# Patient Record
Sex: MALE | URBAN - NONMETROPOLITAN AREA
[De-identification: names, ages, dates, MRNs, and addresses within clinical notes are randomized per-mention and may not be internally consistent; named-entity substitution may affect disease eponyms.]

---

## 2018-08-21 PROBLEM — E11.9: Noted: 2019-10-25

## 2018-08-21 PROBLEM — H16.223: Noted: 2020-03-09

## 2018-08-21 PROBLEM — Z96.1: Noted: 2018-08-21

## 2018-08-21 PROBLEM — L71.8: Noted: 2019-10-25

## 2018-08-21 PROBLEM — H26.491: Noted: 2019-11-04

## 2018-08-21 PROBLEM — H04.123: Noted: 2019-10-25

## 2018-08-21 PROBLEM — H40.11X2: Noted: 2019-10-25

## 2018-08-21 PROBLEM — L71.8: Noted: 2018-08-21

## 2018-08-21 PROBLEM — H26.493: Noted: 2019-10-29

## 2018-08-21 PROBLEM — H40.1131: Noted: 2020-02-13

## 2018-08-21 PROBLEM — E11.9: Noted: 2018-05-22

## 2018-08-21 PROBLEM — L71.8: Noted: 2020-03-09

## 2019-02-25 ENCOUNTER — IMPORTED ENCOUNTER (OUTPATIENT)
Dept: URBAN - NONMETROPOLITAN AREA CLINIC 1 | Facility: CLINIC | Age: 79
End: 2019-02-25

## 2019-02-25 PROCEDURE — 99213 OFFICE O/P EST LOW 20 MIN: CPT

## 2019-02-25 NOTE — PATIENT DISCUSSION
POAG OU- Discussed diagnosis in detail with patient- IOP stable today at [address] done today: Grade IV w/ moderate pigment- OCT disc done today reliable normal RNFL OU- [facility] done today 560/564 normal limits- Cup to disc noted at . 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

## 2019-05-23 ENCOUNTER — IMPORTED ENCOUNTER (OUTPATIENT)
Dept: URBAN - NONMETROPOLITAN AREA CLINIC 1 | Facility: CLINIC | Age: 79
End: 2019-05-23

## 2019-05-23 PROCEDURE — 92083 EXTENDED VISUAL FIELD XM: CPT

## 2019-05-23 NOTE — PATIENT DISCUSSION
Testing Only: 24-2 VFOD: unreliable w/scattered & non-specific defectsOS: unreliable w/scattered & non-specific defectsThis test needs to be repeatedNotes below are from previous visit: POAG OU- Discussed diagnosis in detail with patient- IOP stable today at 16 OU - Gonio done today: Grade IV w/ moderate pigment- OCT disc done today reliable normal RNFL OU- [facility] done today 560/564 normal limits- Cup to disc noted at . 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

## 2019-05-24 ENCOUNTER — IMPORTED ENCOUNTER (OUTPATIENT)
Dept: URBAN - NONMETROPOLITAN AREA CLINIC 1 | Facility: CLINIC | Age: 79
End: 2019-05-24

## 2019-05-24 PROCEDURE — 99213 OFFICE O/P EST LOW 20 MIN: CPT

## 2019-05-24 NOTE — PATIENT DISCUSSION
Pseudophakia OU- Both intraocular lenses are stable and in place - PCO noted OU OS surgically ready- Refer to [address] [place] [address] only for [address] OU- Discussed diagnosis in detail with patient- IOP stable today at 15 OU - Gonio done today: Grade IV w/ moderate pigment- OCT disc done today reliable normal RNFL OU- [facility] done today 560/564 normal limits- Cup to disc noted at . 04 OD and 0.4/0.5 OS - Continue Timolol QAM OU Lumigan QHS OU - Would like to obtain further testing - RTC in 3 months; 's Notes: MRDFEVF 5/23/19OCT disc 11/19/18Gonio 11/19/18PACH Hdz 11/19/18 560/564

## 2019-10-25 ENCOUNTER — IMPORTED ENCOUNTER (OUTPATIENT)
Dept: URBAN - NONMETROPOLITAN AREA CLINIC 1 | Facility: CLINIC | Age: 79
End: 2019-10-25

## 2019-10-25 PROCEDURE — 66821 AFTER CATARACT LASER SURGERY: CPT

## 2019-10-25 PROCEDURE — 92014 COMPRE OPH EXAM EST PT 1/>: CPT

## 2019-10-25 NOTE — PATIENT DISCUSSION
PCO-Explained PCO and RBAs of YAG Capsulotomy to pt. -Pt elects to proceed.  YAG Caps OS today and YAG Caps OD in 1-2 weeks.; 's Notes: MRDFEVF 5/23/19OCT disc 11/19/18Gonio 11/19/18PACH Hdz 11/19/18 560/564

## 2019-11-01 ENCOUNTER — IMPORTED ENCOUNTER (OUTPATIENT)
Dept: URBAN - NONMETROPOLITAN AREA CLINIC 1 | Facility: CLINIC | Age: 79
End: 2019-11-01

## 2019-11-01 PROCEDURE — 66821 AFTER CATARACT LASER SURGERY: CPT

## 2019-11-01 NOTE — PATIENT DISCUSSION
PCO-Explained PCO and RBAs of YAG Capsulotomy to pt. -Pt elects to proceed.  YAG Caps OD today and POV in 1-2 weeks.; 's Notes: MRDFEVF 5/23/19OCT disc 11/19/18Gonio 11/19/18PACH Andreina 11/19/18 560/564

## 2019-11-12 ENCOUNTER — IMPORTED ENCOUNTER (OUTPATIENT)
Dept: URBAN - NONMETROPOLITAN AREA CLINIC 1 | Facility: CLINIC | Age: 79
End: 2019-11-12

## 2019-11-12 PROCEDURE — 99024 POSTOP FOLLOW-UP VISIT: CPT

## 2019-11-12 NOTE — PATIENT DISCUSSION
s/p YAG PC OU-  discussed findings w/patient-  patient is very happy with vision-  continue to monitor at 3 mo f/u POAG or prn; 's Notes: MRDFEVF 5/23/19OCT disc 11/19/18Gonio 11/19/18PACH Andreina 11/19/18 560/564

## 2020-02-13 ENCOUNTER — IMPORTED ENCOUNTER (OUTPATIENT)
Dept: URBAN - NONMETROPOLITAN AREA CLINIC 1 | Facility: CLINIC | Age: 80
End: 2020-02-13

## 2020-02-13 PROCEDURE — 99213 OFFICE O/P EST LOW 20 MIN: CPT

## 2020-02-13 PROCEDURE — 92133 CPTRZD OPH DX IMG PST SGM ON: CPT

## 2020-02-13 NOTE — PATIENT DISCUSSION
POAG OU-  Discussed diagnosis in detail with patient-  IOP stable today at 15 OU -  Gonio done 11/19/2018: Grade IV w/ moderate pigment-  OCT disc done today 2/13/2020 OD: normal RNFL OS: mild temporal thinning all other quadrants normal stable-  PACH done 11/19/2018 560/564 normal limits-  Cup to disc noted at . 04 OD and 0.4/0.5 OS -  Continue Timolol QAM OU-  continue Lumigan QHS OU -  RTC in 3 months Pseudophakia OU-  discussed findings w/patient-  s/p YAG PC OU open -  [id number] yearly or prnType II DM w/o Retinopathy OU -  discussed findings w/patient-  condition controlled with diet.  Patient was taken off of Metformin-  no retinopathy to extent seen -  discussed the importyance of good blood sugar control and the negative effects of poorly controlled sugars on ocular health -  will send notes from today to Dr. Garcia Ackerman-  monitor yearly or prn OLIVIA OU -  discussed findings w/patient-  continue Refresh Optive PRN OU -  monitor yearly or prn; 's Notes: MRDFEVF 5/23/19OCT disc 2/13/2020Gonio 11/19/18PACH Hdz 11/19/18 560/564

## 2020-05-22 ENCOUNTER — IMPORTED ENCOUNTER (OUTPATIENT)
Dept: URBAN - NONMETROPOLITAN AREA CLINIC 1 | Facility: CLINIC | Age: 80
End: 2020-05-22

## 2020-05-22 PROCEDURE — 92020 GONIOSCOPY: CPT

## 2020-05-22 PROCEDURE — 99213 OFFICE O/P EST LOW 20 MIN: CPT

## 2020-05-22 NOTE — PATIENT DISCUSSION
POAG OU-  Discussed diagnosis in detail with patient-  IOP stable today at 15 OU -  Gonio done today 5/22/2020: Grade IV w/ moderate pigment stable -  OCT disc done 2/13/2020 OD: normal RNFL OS: mild temporal thinning all other quadrants normal stable-  PACH done 11/19/2018 560/564 normal limits-  Cup to disc noted at . 04 OD and 0.4/0.5 OS -  Continue Timolol QAM OU-  continue Lumigan QHS OU -  RTC in 3 months w/24-2 VF Pseudophakia OU-  discussed findings w/patient-  s/p YAG PC OU open -  [id number] yearly or prnType II DM w/o Retinopathy OU -  discussed findings w/patient-  condition controlled with diet.  Patient was taken off of Metformin-  no retinopathy to extent seen -  discussed the importyance of good blood sugar control and the negative effects of poorly controlled sugars on ocular health -  will send notes from today to Dr. Shey Sommers-  monitor yearly or prn OLIVIA OU -  discussed findings w/patient-  continue Refresh Optive PRN OU -  monitor yearly or prn; 's Notes: MRDFEVF 5/23/19OCT disc 2/13/2020Gonio 5/22/2020PACH Hdz 11/19/18 560/564

## 2020-07-06 ENCOUNTER — IMPORTED ENCOUNTER (OUTPATIENT)
Dept: URBAN - NONMETROPOLITAN AREA CLINIC 1 | Facility: CLINIC | Age: 80
End: 2020-07-06

## 2020-07-06 PROCEDURE — 99213 OFFICE O/P EST LOW 20 MIN: CPT

## 2020-07-06 NOTE — PATIENT DISCUSSION
Superficial Punctate Keratitis OS-  discussed findings w/patient-  increase Optive to at least q1-2h OS-  continue to monitor as scheduled; 's Notes: MRDFEVF 5/23/19OCT disc 2/13/2020Gonimadai 5/22/2020PACH Andreina 11/19/18 560/565

## 2020-08-12 PROBLEM — H16.223: Noted: 2020-08-12

## 2020-08-12 PROBLEM — Z96.1: Noted: 2020-08-12

## 2020-08-12 PROBLEM — H40.1131: Noted: 2020-08-12

## 2020-08-12 PROBLEM — E11.9: Noted: 2020-08-12

## 2020-08-12 PROBLEM — H16.142: Noted: 2020-08-12

## 2020-08-12 PROBLEM — L71.8: Noted: 2020-08-12

## 2020-08-20 ENCOUNTER — IMPORTED ENCOUNTER (OUTPATIENT)
Dept: URBAN - NONMETROPOLITAN AREA CLINIC 1 | Facility: CLINIC | Age: 80
End: 2020-08-20

## 2020-08-20 PROCEDURE — 92083 EXTENDED VISUAL FIELD XM: CPT

## 2020-08-20 PROCEDURE — 99213 OFFICE O/P EST LOW 20 MIN: CPT

## 2020-08-20 NOTE — PATIENT DISCUSSION
POAG OU-  Discussed diagnosis in detail with patient-  IOP stable today at 15 OU -  24-2 VF done 8/20/2020    OD: R non-specific defects scattered scotoma stable    OS: R non-specific defects scattered scotoma stable-  Gonio done 5/22/2020: Grade IV w/ moderate pigment stable -  OCT disc done 2/13/2020     OD: normal RNFL     OS: mild temporal thinning all other quadrants normal stable-  PACH done 11/19/2018 560/564 normal limits-  Cup to disc noted at . 04 OD and 0.4/0.5 OS -  Continue Timolol QAM OU-  continue Lumigan QHS OU -  RTC in 3 mo f/u POAGPseudophakia OU-  discussed findings w/patient-  s/p YAG PC OU open -  monitor yearly or prnType II DM w/o Retinopathy OU -  discussed findings w/patient-  condition controlled with diet.  Patient was taken off of Metformin-  no retinopathy to extent seen -  discussed the importyance of good blood sugar control and the negative effects of poorly controlled sugars on ocular health -  will send notes from today to Dr. Eleanor Grant-  monitor yearly or prn OLIVIA OU -  discussed findings w/patient-  continue Refresh Optive PRN OU -  monitor yearly or prn; 's Notes: [id number] VF 8/20/2020OCT disc 2/13/2020Gonio 5/22/2020PACH Andreina 11/19/18 560/564

## 2020-11-18 PROBLEM — L71.8: Noted: 2020-11-18

## 2020-11-18 PROBLEM — H40.1131: Noted: 2020-11-18

## 2020-11-18 PROBLEM — E11.9: Noted: 2020-11-18

## 2020-11-18 PROBLEM — H16.223: Noted: 2020-11-18

## 2020-11-18 PROBLEM — Z96.1: Noted: 2020-11-18

## 2020-11-20 ENCOUNTER — IMPORTED ENCOUNTER (OUTPATIENT)
Dept: URBAN - NONMETROPOLITAN AREA CLINIC 1 | Facility: CLINIC | Age: 80
End: 2020-11-20

## 2020-11-20 PROCEDURE — 99213 OFFICE O/P EST LOW 20 MIN: CPT

## 2020-11-20 NOTE — PATIENT DISCUSSION
POAG OU-  Discussed diagnosis in detail with patient-  IOP stable today at 15 OU -  24-2 VF done 8/20/2020    OD: R non-specific defects scattered scotoma stable    OS: R non-specific defects scattered scotoma stable-  Gonio done 5/22/2020: Grade IV w/ moderate pigment stable -  OCT disc done 2/13/2020     OD: normal RNFL     OS: mild temporal thinning all other quadrants normal stable-  PACH done 11/19/2018 560/564 normal limits-  Cup to disc noted at . 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  Patient was taken off of Metformin-  no retinopathy to extent seen -  discussed the importyance of good blood sugar control and the negative effects of poorly controlled sugars on ocular health -  will send notes from today to Dr. Hammond Bodily-  monitor yearly or prn OLIVIA OU -  discussed findings w/patient-  continue Refresh Optive PRN OU -  monitor yearly or prn; 's Notes: [id number] VF 8/20/2020OCT disc 2/13/2020Gonio 5/22/2020PACH Andreina 11/19/18 560/564

## 2021-03-04 ENCOUNTER — IMPORTED ENCOUNTER (OUTPATIENT)
Dept: URBAN - NONMETROPOLITAN AREA CLINIC 1 | Facility: CLINIC | Age: 81
End: 2021-03-04

## 2021-03-04 PROCEDURE — 99213 OFFICE O/P EST LOW 20 MIN: CPT

## 2021-03-04 PROCEDURE — 92133 CPTRZD OPH DX IMG PST SGM ON: CPT

## 2021-03-04 NOTE — PATIENT DISCUSSION
POAG OU-  Discussed diagnosis in detail with patient-  IOP stable today at 15 OU -  24-2 VF done 8/20/2020    OD: R non-specific defects scattered scotoma stable    OS: R non-specific defects scattered scotoma stable-  Gonio done 5/22/2020: Grade IV w/ moderate pigment stable -  OCT disc done 3/4/2021     OD: mild temporal thinning all other quadrants normal    OS: mild temporal thinning all other quadrants normal -  PACH done 11/19/2018 560/564 normal limits-  Cup to disc noted at . 04 OD and 0.4/0.5 OS -  Continue Timolol QAM OU-  continue Lumigan QHS OU -  RTC in 3 mo f/u POAG w/ GonioPseudophakia OU-  discussed findings w/patient-  s/p YAG PC OU open -  monitor yearly or prnType II DM w/o Retinopathy OU -  discussed findings w/patient-  Last A1c 5.5-  Blood Sugars 115-  condition controlled with diet.  Patient was taken off of Metformin-  no retinopathy to extent seen -  discussed the importyance of good blood sugar control and the negative effects of poorly controlled sugars on ocular health -  will send notes from today to Dr. Hammond Bodily-  monitor yearly or prn OLIVIA OU -  discussed findings w/patient-  continue Refresh Optive PRN OU -  monitor yearly or prn; 's Notes: [id number] VF 8/20/2020OCT disc3/4/2021Gonio 5/22/2020PACH Hdz 11/19/18 560/564

## 2021-06-03 ENCOUNTER — IMPORTED ENCOUNTER (OUTPATIENT)
Dept: URBAN - NONMETROPOLITAN AREA CLINIC 1 | Facility: CLINIC | Age: 81
End: 2021-06-03

## 2021-06-03 PROCEDURE — 92020 GONIOSCOPY: CPT

## 2021-06-03 PROCEDURE — 99213 OFFICE O/P EST LOW 20 MIN: CPT

## 2021-06-03 NOTE — PATIENT DISCUSSION
POAG OU-  Discussed diagnosis in detail with patient-  IOP stable today at 15 OU -  24-2 VF done 8/20/2020    OD: R non-specific defects scattered scotoma stable    OS: R non-specific defects scattered scotoma stable-  Gonio done 7/22/2020: Grade IV w/ moderate pigment stable -  OCT disc done 3/4/2021     OD: mild temporal thinning all other quadrants normal    OS: mild temporal thinning all other quadrants normal -  PACH done 11/19/2018 560/564 normal limits-  Cup to disc noted at . 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 Garcia Ackerman-  monitor yearly or PRN; 's Notes: [id number] VF  8/20/2020OCT disc  3/4/2021Gonio 5/22/2020 6/3/2021PACH Hdz 11/19/18 560/564

## 2021-07-22 PROBLEM — E11.9: Noted: 2021-07-22

## 2021-07-22 PROBLEM — H16.223: Noted: 2020-11-18

## 2021-07-22 PROBLEM — H40.1131: Noted: 2020-11-18

## 2021-07-22 PROBLEM — Z96.1: Noted: 2021-07-22

## 2021-07-22 PROBLEM — L71.8: Noted: 2020-11-18

## 2021-09-09 ENCOUNTER — IMPORTED ENCOUNTER (OUTPATIENT)
Dept: URBAN - NONMETROPOLITAN AREA CLINIC 1 | Facility: CLINIC | Age: 81
End: 2021-09-09

## 2021-09-09 PROCEDURE — 99213 OFFICE O/P EST LOW 20 MIN: CPT

## 2021-09-09 PROCEDURE — 92083 EXTENDED VISUAL FIELD XM: CPT

## 2021-09-09 NOTE — PATIENT DISCUSSION
POAG OU-  Discussed diagnosis in detail with patient-  IOP's 16 OU-  24-2 VF done 9/9/2021    OD: R non-specific defects scattered scotoma stable    OS: R non-specific defects scattered scotoma stable-  Gonio done 7/22/2020    OD: Grade 4 w/moderate pigment    OS: Grade 4 w/moderate pigment-  OCT disc done 3/4/2021     OD: mild temporal thinning all other quadrants normal    OS: mild temporal thinning all other quadrants normal -  PACH done 11/19/2018 560/564 normal limits-  Cup to disc noted at . 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 Solitario Curry-  monitor yearly or PRN; 's Notes: [id number] VF 9/9/2021OCT disc 3/4/2021Gonio 6/3/2021PACH Andreina 11/19/18 560/564

## 2022-02-02 ENCOUNTER — PREPPED CHART (OUTPATIENT)
Dept: URBAN - NONMETROPOLITAN AREA CLINIC 4 | Facility: CLINIC | Age: 82
End: 2022-02-02

## 2022-02-02 ASSESSMENT — VISUAL ACUITY
OS_CC: 20/20
OD_CC: 20/20
OU_CC: 20/20

## 2022-02-04 ENCOUNTER — FOLLOW UP (OUTPATIENT)
Dept: URBAN - NONMETROPOLITAN AREA CLINIC 4 | Facility: CLINIC | Age: 82
End: 2022-02-04

## 2022-02-04 DIAGNOSIS — H40.1131: ICD-10-CM

## 2022-02-04 PROCEDURE — 99213 OFFICE O/P EST LOW 20 MIN: CPT

## 2022-02-04 ASSESSMENT — VISUAL ACUITY
OU_SC: 20/25
OD_SC: 20/20
OS_SC: 20/25

## 2022-02-04 ASSESSMENT — TONOMETRY
OS_IOP_MMHG: 15
OD_IOP_MMHG: 15

## 2022-04-17 ASSESSMENT — VISUAL ACUITY
OS_CC: 20/25-2
OD_CC: 20/20
OS_CC: 20/25+
OS_CC: 20/25-2
OS_PH: 20/30-1
OD_CC: 20/20-2
OS_CC: 20/40
OU_CC: 20/20
OU_CC: 20/25-
OD_GLARE: 20/60
OS_CC: J1+
OD_CC: J1+
OD_CC: 20/25+2
OD_CC: 20/20-1
OS_GLARE: 20/50
OD_GLARE: 20/60
OS_CC: 20/25
OS_CC: 20/30
OD_CC: 20/25
OS_CC: 20/25+2
OU_SC: 20/20
OU_CC: 20/20
OS_SC: 20/25
OD_GLARE: 20/20
OD_CC: 20/20
OS_CC: 20/30-1
OD_SC: 20/20
OS_CC: 20/30-1 BLURRY
OU_CC: J1+
OD_CC: 20/25
OS_GLARE: 20/200
OD_CC: 20/25+
OU_CC: J1+
OS_CC: 20/30-
OD_CC: 20/20
OD_SC: 20/25+2
OS_SC: 20/20
OD_CC: 20/20
OU_CC: 20/20

## 2022-04-17 ASSESSMENT — PACHYMETRY
OD_CT_UM: 560
OS_CT_UM: 564
OD_CT_UM: 560
OS_CT_UM: 564
OD_CT_UM: 560
OD_CT_UM: 560
OS_CT_UM: 564
OD_CT_UM: 560
OS_CT_UM: 564
OD_CT_UM: 560
OD_CT_UM: 560
OS_CT_UM: 564
OS_CT_UM: 564
OD_CT_UM: 560
OS_CT_UM: 564
OD_CT_UM: 560
OD_CT_UM: 560
OS_CT_UM: 564
OS_CT_UM: 564
OD_CT_UM: 560
OS_CT_UM: 564

## 2022-04-17 ASSESSMENT — TONOMETRY
OD_IOP_MMHG: 15
OD_IOP_MMHG: 15
OS_IOP_MMHG: 15
OD_IOP_MMHG: 15
OS_IOP_MMHG: 16
OS_IOP_MMHG: 15
OD_IOP_MMHG: 17
OD_IOP_MMHG: 18
OD_IOP_MMHG: 15
OS_IOP_MMHG: 15
OS_IOP_MMHG: 16
OD_IOP_MMHG: 15
OD_IOP_MMHG: 19
OS_IOP_MMHG: 15
OD_IOP_MMHG: 15
OS_IOP_MMHG: 15
OD_IOP_MMHG: 16
OS_IOP_MMHG: 17
OS_IOP_MMHG: 15
OD_IOP_MMHG: 15
OS_IOP_MMHG: 16

## 2022-04-18 ENCOUNTER — FOLLOW UP (OUTPATIENT)
Dept: URBAN - NONMETROPOLITAN AREA CLINIC 4 | Facility: CLINIC | Age: 82
End: 2022-04-18

## 2022-04-18 DIAGNOSIS — H40.1131: ICD-10-CM

## 2022-04-18 PROCEDURE — 92133 CPTRZD OPH DX IMG PST SGM ON: CPT

## 2022-04-18 PROCEDURE — 99213 OFFICE O/P EST LOW 20 MIN: CPT

## 2022-04-18 ASSESSMENT — TONOMETRY
OD_IOP_MMHG: 16
OS_IOP_MMHG: 17

## 2022-04-18 ASSESSMENT — VISUAL ACUITY
OD_SC: 20/25
OS_SC: 20/25+2

## 2022-08-23 ENCOUNTER — FOLLOW UP (OUTPATIENT)
Dept: URBAN - NONMETROPOLITAN AREA CLINIC 4 | Facility: CLINIC | Age: 82
End: 2022-08-23

## 2022-08-23 DIAGNOSIS — E11.9: ICD-10-CM

## 2022-08-23 PROCEDURE — 92134 CPTRZ OPH DX IMG PST SGM RTA: CPT

## 2022-08-23 PROCEDURE — 92014 COMPRE OPH EXAM EST PT 1/>: CPT

## 2022-08-23 ASSESSMENT — TONOMETRY
OS_IOP_MMHG: 16
OD_IOP_MMHG: 15

## 2022-08-23 ASSESSMENT — VISUAL ACUITY
OS_PH: 20/30
OD_SC: 20/25
OU_SC: 20/25
OS_SC: 20/40

## 2023-04-20 ENCOUNTER — FOLLOW UP (OUTPATIENT)
Dept: RURAL CLINIC 1 | Facility: CLINIC | Age: 83
End: 2023-04-20

## 2023-04-20 DIAGNOSIS — Z96.1: ICD-10-CM

## 2023-04-20 DIAGNOSIS — H16.223: ICD-10-CM

## 2023-04-20 DIAGNOSIS — H04.123: ICD-10-CM

## 2023-04-20 DIAGNOSIS — E11.9: ICD-10-CM

## 2023-04-20 DIAGNOSIS — H40.1131: ICD-10-CM

## 2023-04-20 DIAGNOSIS — L71.9: ICD-10-CM

## 2023-04-20 PROCEDURE — 92014 COMPRE OPH EXAM EST PT 1/>: CPT

## 2023-04-20 PROCEDURE — 92083 EXTENDED VISUAL FIELD XM: CPT

## 2023-04-20 ASSESSMENT — VISUAL ACUITY
OS_SC: 20/25-1
OU_SC: 20/25
OD_SC: 20/25+1

## 2023-04-20 ASSESSMENT — TONOMETRY
OD_IOP_MMHG: 23
OS_IOP_MMHG: 16

## 2023-10-12 ENCOUNTER — FOLLOW UP (OUTPATIENT)
Dept: URBAN - NONMETROPOLITAN AREA CLINIC 4 | Facility: CLINIC | Age: 83
End: 2023-10-12

## 2023-10-12 DIAGNOSIS — Z96.1: ICD-10-CM

## 2023-10-12 DIAGNOSIS — E11.9: ICD-10-CM

## 2023-10-12 DIAGNOSIS — H40.1131: ICD-10-CM

## 2023-10-12 DIAGNOSIS — H16.223: ICD-10-CM

## 2023-10-12 PROCEDURE — 99213 OFFICE O/P EST LOW 20 MIN: CPT

## 2023-10-12 PROCEDURE — 92133 CPTRZD OPH DX IMG PST SGM ON: CPT

## 2023-10-12 ASSESSMENT — TONOMETRY
OS_IOP_MMHG: 15
OD_IOP_MMHG: 16

## 2023-10-12 ASSESSMENT — VISUAL ACUITY
OD_SC: 20/25-1
OS_SC: 20/40-1

## 2024-02-19 ENCOUNTER — FOLLOW UP (OUTPATIENT)
Dept: URBAN - NONMETROPOLITAN AREA CLINIC 4 | Facility: CLINIC | Age: 84
End: 2024-02-19

## 2024-02-19 DIAGNOSIS — H16.223: ICD-10-CM

## 2024-02-19 DIAGNOSIS — E11.9: ICD-10-CM

## 2024-02-19 DIAGNOSIS — Z96.1: ICD-10-CM

## 2024-02-19 DIAGNOSIS — H40.1131: ICD-10-CM

## 2024-02-19 PROCEDURE — 92014 COMPRE OPH EXAM EST PT 1/>: CPT

## 2024-02-19 ASSESSMENT — VISUAL ACUITY
OS_PH: 20/30
OS_SC: 20/50
OD_SC: 20/20

## 2024-02-19 ASSESSMENT — TONOMETRY
OD_IOP_MMHG: 14
OS_IOP_MMHG: 16

## 2024-08-28 ENCOUNTER — DIAGNOSTICS ONLY (OUTPATIENT)
Dept: URBAN - NONMETROPOLITAN AREA CLINIC 4 | Facility: CLINIC | Age: 84
End: 2024-08-28

## 2024-08-28 DIAGNOSIS — H40.1131: ICD-10-CM

## 2024-08-28 PROCEDURE — 92083 EXTENDED VISUAL FIELD XM: CPT

## 2024-08-29 ENCOUNTER — FOLLOW UP (OUTPATIENT)
Dept: URBAN - NONMETROPOLITAN AREA CLINIC 4 | Facility: CLINIC | Age: 84
End: 2024-08-29

## 2024-08-29 DIAGNOSIS — Z96.1: ICD-10-CM

## 2024-08-29 DIAGNOSIS — H16.223: ICD-10-CM

## 2024-08-29 DIAGNOSIS — E11.9: ICD-10-CM

## 2024-08-29 DIAGNOSIS — H40.1131: ICD-10-CM

## 2024-08-29 PROCEDURE — 92020 GONIOSCOPY: CPT

## 2024-08-29 PROCEDURE — 99213 OFFICE O/P EST LOW 20 MIN: CPT

## 2024-08-29 ASSESSMENT — VISUAL ACUITY
OD_PH: 20/40
OS_PH: 20/50
OS_SC: 20/70
OD_SC: 20/40

## 2024-08-29 ASSESSMENT — TONOMETRY
OS_IOP_MMHG: 15
OD_IOP_MMHG: 17

## 2025-02-13 ENCOUNTER — COMPREHENSIVE EXAM (OUTPATIENT)
Age: 85
End: 2025-02-13

## 2025-02-13 DIAGNOSIS — H40.1131: ICD-10-CM

## 2025-02-13 DIAGNOSIS — E11.9: ICD-10-CM

## 2025-02-13 DIAGNOSIS — H16.223: ICD-10-CM

## 2025-02-13 DIAGNOSIS — Z96.1: ICD-10-CM

## 2025-02-13 PROCEDURE — 92133 CPTRZD OPH DX IMG PST SGM ON: CPT

## 2025-02-13 PROCEDURE — 99214 OFFICE O/P EST MOD 30 MIN: CPT

## 2025-08-27 ENCOUNTER — FOLLOW UP (OUTPATIENT)
Age: 85
End: 2025-08-27

## 2025-08-27 DIAGNOSIS — H16.223: ICD-10-CM

## 2025-08-27 DIAGNOSIS — E11.9: ICD-10-CM

## 2025-08-27 DIAGNOSIS — Z96.1: ICD-10-CM

## 2025-08-27 DIAGNOSIS — H40.1131: ICD-10-CM

## 2025-08-27 PROCEDURE — 92083 EXTENDED VISUAL FIELD XM: CPT

## 2025-08-27 PROCEDURE — 99213 OFFICE O/P EST LOW 20 MIN: CPT
